# Patient Record
(demographics unavailable — no encounter records)

---

## 2025-03-01 NOTE — ASSESSMENT
[FreeTextEntry1] : 7/24/24 Pt s/p appendectomy. The patient has a history of hypothyroidism though currently is clinically euthyroid with no hypothyroid or hyperthyroid signs or symptoms. The patient's free T4 was elevated at 2.3 from 1.9 & 2.1, free T3 was in the normal range and TSH suppressed. Risks of subclinical hyperthyroidism (BMD, cardio etc.) and hypothyroidism (fatigue, muscle aches, weight gain etc.) discussed in detail and given clinical euthyroid state after discussion pt. is comfortable with current dose of levothyroxine 137 daily and Cytomel once daily, but suggested reduction to levothyroxine 112.  Lipid levels were reviewed with patient and importance and function of LDL, HDL and triglycerides discussed. Methods to increase HDL (exercise, fish, beans, oatmeal, legumes etc.) discussed with pt. in conjunction with measures to decrease LDL including diet and exercise. LDL/HDL is 115/71 from 144/57, 100/66, 88/61 & 110/65. Currently on Zocor 40 mg.  The previous B12 was > 2000 and Vitamin D fine at 71 ng/mL